# Patient Record
Sex: FEMALE | Race: WHITE | Employment: FULL TIME | URBAN - METROPOLITAN AREA
[De-identification: names, ages, dates, MRNs, and addresses within clinical notes are randomized per-mention and may not be internally consistent; named-entity substitution may affect disease eponyms.]

---

## 2017-07-19 ENCOUNTER — OFFICE VISIT (OUTPATIENT)
Dept: FAMILY MEDICINE CLINIC | Age: 52
End: 2017-07-19

## 2017-07-19 VITALS
HEART RATE: 74 BPM | WEIGHT: 147 LBS | OXYGEN SATURATION: 99 % | BODY MASS INDEX: 23.07 KG/M2 | SYSTOLIC BLOOD PRESSURE: 128 MMHG | TEMPERATURE: 98.2 F | DIASTOLIC BLOOD PRESSURE: 74 MMHG | HEIGHT: 67 IN | RESPIRATION RATE: 18 BRPM

## 2017-07-19 DIAGNOSIS — Z00.00 ROUTINE GENERAL MEDICAL EXAMINATION AT A HEALTH CARE FACILITY: Primary | ICD-10-CM

## 2017-07-19 DIAGNOSIS — N93.9 ABNORMAL UTERINE BLEEDING (AUB): ICD-10-CM

## 2017-07-19 DIAGNOSIS — D22.9 NUMEROUS MOLES: ICD-10-CM

## 2017-07-19 NOTE — PROGRESS NOTES
Chief Complaint   Patient presents with    Complete Physical     Had pap done at gyn this past Monday , [facility]. this Friday       Reviewed Record in preparation for visit and have obtained necessary documentation. Identified pt with two pt identifiers (Name @ )    Health Maintenance Due   Topic    PAP AKA CERVICAL CYTOLOGY     BREAST CANCER SCRN MAMMOGRAM          1. Have you been to the ER, urgent care clinic since your last visit? Hospitalized since your last visit? No    2. Have you seen or consulted any other health care providers outside of the [address] [place] [address] since your last visit? Include any pap smears or colon screening. Saw gyn this past Monday for pap.

## 2017-07-19 NOTE — PATIENT INSTRUCTIONS
Abnormal Uterine Bleeding: Care Instructions  Your Care Instructions  Abnormal uterine bleeding (AUB) is irregular bleeding from the uterus that is longer or heavier than usual or does not occur at your regular time. Sometimes it is caused by changes in hormone levels. It can also be caused by growths in the uterus, such as fibroids or polyps. Sometimes a cause cannot be found. You may have heavy bleeding when you are not expecting your period. Your doctor may suggest a pregnancy test, if you think you are pregnant. Follow-up care is a key part of your treatment and safety. Be sure to make and go to all appointments, and call your doctor if you are having problems. It's also a good idea to know your test results and keep a list of the medicines you take. How can you care for yourself at home? · Be safe with medicines. Take pain medicines exactly as directed. ¨ If the doctor gave you a prescription medicine for pain, take it as prescribed. ¨ If you are not taking a prescription pain medicine, ask your doctor if you can take an over-the-counter medicine. · You may be low in iron because of blood loss. Eat a balanced diet that is high in iron and vitamin C. Foods rich in iron include red meat, shellfish, eggs, beans, and leafy green vegetables. Talk to your doctor about whether you need to take iron pills or a multivitamin. When should you call for help? Call 911 anytime you think you may need emergency care. For example, call if:  · You passed out (lost consciousness). Call your doctor now or seek immediate medical care if:  · You have sudden, severe pain in your belly or pelvis. · You have severe vaginal bleeding. You are soaking through your usual pads or tampons every hour for 2 or more hours. · You feel dizzy or lightheaded, or you feel like you may faint. Watch closely for changes in your health, and be sure to contact your doctor if:  · You have new belly or pelvic pain.   · You have a fever.  · Your bleeding gets worse or lasts longer than 1 week. · You think you may be pregnant. Where can you learn more? Go to http://vasquez-william.info/. Enter B639 in the search box to learn more about \"Abnormal Uterine Bleeding: Care Instructions. \"  Current as of: October 13, 2016  Content Version: 11.3  © 8932-7208 [employer]. Care instructions adapted under license by [employer] (which disclaims liability or warranty for this information). If you have questions about a medical condition or this instruction, always ask your healthcare professional. [place] [address] any warranty or liability for your use of this information.

## 2017-07-19 NOTE — PROGRESS NOTES
HISTORY OF PRESENT ILLNESS  Mayelin Guardado is a 45 y.o. female. Blood pressure 128/74, pulse 74, temperature 98.2 °F (36.8 °C), temperature source Oral, resp. rate 18, height 5' 6.5\" (1.689 m), weight 147 lb (66.7 kg), last menstrual period 05/19/2017, SpO2 99 %. Body mass index is 23.37 kg/(m^2). Chief Complaint   Patient presents with    Complete Physical     Had pap done at gyn this past Monday , mammogram yogesh. this Friday        HPI  Mayelin Guardado 45 y.o. female  presents to the office today for complete physical.    Abnormal Uterine Bleeding: Pt states that she went to Dr. Francia Marcelino (Gynecology) on  07/17/17 for yearly checkup. Pt states she has abnormal uterine bleeding, which is aggravated during intercourse. Pt states that Dr. Elizabeth Perez wanted me to do a trans vaginal US due to abnormal uterine bleeding. Pt states that she had a mesh inserted 5 to 6 years ago. Pt states that Dr. Elizabeth Perez suggested a hysterectomy if problems worsen. I have ordered a Pelvic(Non-Obstetric) , and trans vaginal US for further evaluation. Anxiety/Depression: Depression screening completed and pt stated little interest in doings things several days, feeling down several days, sleep issues nearly every day, feeling tired several days, poor appetite/overeating not at all, feeling bad about oneself not at all, issues with concentration not at all, family members have noticed psychoretardation several days, and denies SI/HI. PHQ-9 score today 7. Pt states problems haven't made life too difficult. Pt states that she takes supplements for depression. I have advised pt that nothing would be needed due to low score. Health Maintenance: Pt is due for mammogram on 07/21/17. Current Outpatient Prescriptions   Medication Sig Dispense Refill    Mv,Ca,Min-FA-Herbal No.157 400 mcg tab Take  by mouth.  Cholecalciferol, Vitamin D3, (VITAMIN D3) 1,000 unit cap Take 5,000 Units by mouth.       zinc 15 mg tab Take  by mouth.      MULTIVITS,CA,MINERALS/IRON/FA (ONE-A-DAY WOMENS FORMULA PO) Take  by mouth daily.  GLUC MILLER/CHONDRO MILLER A/VIT C/MN (GLUCOSAMINE 1500 COMPLEX PO) Take  by mouth.  fish oil-dha-epa (FISH OIL) 1,200-144-216 mg Cap Take  by mouth.  flaxseed oil-vitamin e 1,000-1 mg-unit Cap Take  by mouth. No Known Allergies  Past Medical History:   Diagnosis Date    Depression      Past Surgical History:   Procedure Laterality Date    BREAST SURGERY PROCEDURE UNLISTED  1992    lumpectomy L breast     BREAST SURGERY PROCEDURE UNLISTED  2005    titanium inserted under left breasr    HX COLONOSCOPY  6/3/13    HX GYN  2010    Herpes simplex    HX ORTHOPAEDIC      sprained right ankle 2007    HX OTHER SURGICAL      Ganglin cyst removed from (R) wrist Apr 2010    HX UROLOGICAL      cystoscopy     Family History   Problem Relation Age of Onset   [facility] COPD Father    [facility] Breast Problems Mother    [facility] Pacemaker Mother      defribaliter    Cataract Mother     Hypertension Mother     High Cholesterol Mother     Herpes Mother      Social History   Substance Use Topics    Smoking status: Never Smoker    Smokeless tobacco: Never Used    Alcohol use No        Review of Systems   Constitutional: Negative for chills, diaphoresis, fever, malaise/fatigue and weight loss. HENT: Negative for congestion, ear discharge, ear pain, hearing loss, nosebleeds, sore throat and tinnitus. Eyes: Negative for blurred vision, double vision, photophobia, pain, discharge and redness. Respiratory: Negative for cough, hemoptysis, sputum production, shortness of breath, wheezing and stridor. Cardiovascular: Negative for chest pain, palpitations, orthopnea, claudication, leg swelling and PND. Gastrointestinal: Negative for abdominal pain, blood in stool, constipation, diarrhea, heartburn, melena, nausea and vomiting. Genitourinary: Negative for dysuria, flank pain, frequency, hematuria and urgency.         +Uterine Bleeding Musculoskeletal: Negative for back pain, falls, joint pain, myalgias and neck pain. Skin: Negative for itching and rash. Neurological: Negative for dizziness, tingling, tremors, sensory change, speech change, focal weakness, seizures, loss of consciousness, weakness and headaches. Endo/Heme/Allergies: Negative for environmental allergies and polydipsia. Does not bruise/bleed easily. Psychiatric/Behavioral: Positive for depression. Negative for hallucinations, memory loss, substance abuse and suicidal ideas. The patient is not nervous/anxious and does not have insomnia. All other systems reviewed and are negative. Physical Exam   Constitutional: She is oriented to person, place, and time. She appears well-developed and well-nourished. No distress. HENT:   Head: Normocephalic and atraumatic. Right Ear: External ear normal.   Left Ear: External ear normal.   Nose: Nose normal.   Mouth/Throat: Oropharynx is clear and moist. No oropharyngeal exudate. Eyes: Conjunctivae and EOM are normal. Pupils are equal, round, and reactive to light. Right eye exhibits no discharge. Left eye exhibits no discharge. No scleral icterus. Neck: Normal range of motion. Neck supple. No JVD present. No tracheal deviation present. No thyromegaly present. Cardiovascular: Normal rate, regular rhythm, normal heart sounds and intact distal pulses. Exam reveals no gallop and no friction rub. No murmur heard. Pulmonary/Chest: Effort normal and breath sounds normal. No stridor. No respiratory distress. She has no wheezes. She has no rales. She exhibits no tenderness. Abdominal: Soft. Bowel sounds are normal. She exhibits no distension and no mass. There is no tenderness. There is no rebound and no guarding. Musculoskeletal: Normal range of motion. She exhibits no edema or tenderness. Lymphadenopathy:     She has no cervical adenopathy. Neurological: She is alert and oriented to person, place, and time.  She has normal reflexes. No cranial nerve deficit. She exhibits normal muscle tone. Coordination normal.   Skin: Skin is warm and dry. No rash noted. She is not diaphoretic. No erythema. No pallor. Psychiatric: She has a normal mood and affect. Her behavior is normal. Judgment and thought content normal.   Nursing note and vitals reviewed. ASSESSMENT and [address] was seen today for complete physical.    Diagnoses and all orders for this visit:    Routine general medical examination at a health care facility  -     AMB POC EKG ROUTINE W/ 12 LEADS, INTER & REP  -     METABOLIC PANEL, COMPREHENSIVE  -     CBC WITH AUTOMATED DIFF  -     LIPID PANEL  -     TSH 3RD GENERATION  -     T4, FREE  -     VITAMIN D, 25 HYDROXY  -     URINALYSIS W/MICROSCOPIC    Abnormal uterine bleeding (AUB)  -     US TRANSVAGINAL; Future  -     US PELV NON OBS; Future  - Pt has abnormal uterine bleeding. I have ordered a Pelvic (Non-Obstetric) and Transvaginal US for further evaluation. Numerous moles  -     REFERRAL TO DERMATOLOGY José Luis Teran NP)  - Pt has numerous moles on skin. Will refer to José Luis Teran for further evaluation. Follow-up Disposition:  Return in about 1 year (around 7/19/2018) for physical.   Medication risks/benefits/costs/interactions/alternatives discussed with patient. Advised patient to call back or return to office if symptoms worsen/change/persist.  If patient cannot reach us or should anything more severe/urgent arise he/she should proceed directly to the nearest emergency department. Discussed expected course/resolution/complications of diagnosis in detail with patient. Patient given a written after visit summary which includes her diagnoses, current medications and vitals. Patient expressed understanding with the diagnosis and plan. Written by silverio Underwood, as dictated by Aquilino Jacobo M.D.   I have reviewed and agree with the above note and have made corrections where appropriate, Dr. Jewel Luke MD

## 2017-07-19 NOTE — LETTER
7/31/2017 10:09 AM 
 
Ms. Neris Carrion [address] [place] [phone] Dear Neris Carrion: 
 
Please find your most recent results below. Resulted Orders METABOLIC PANEL, COMPREHENSIVE Result Value Ref Range Glucose 81 65 - 99 mg/dL BUN 12 6 - 24 mg/dL Creatinine 0.71 0.57 - 1.00 mg/dL GFR est non-AA 97 >59 mL/min/1.73 GFR est  >59 mL/min/1.73  
 BUN/Creatinine ratio 17 9 - 23 Sodium 142 134 - 144 mmol/L Potassium 4.0 3.5 - 5.2 mmol/L Chloride 99 96 - 106 mmol/L  
 CO2 24 18 - 29 mmol/L Calcium 9.3 8.7 - 10.2 mg/dL Protein, total 7.1 6.0 - 8.5 g/dL Albumin 4.5 3.5 - 5.5 g/dL GLOBULIN, TOTAL 2.6 1.5 - 4.5 g/dL A-G Ratio 1.7 1.2 - 2.2 Bilirubin, total 0.3 0.0 - 1.2 mg/dL Alk. phosphatase 77 39 - 117 IU/L  
 AST (SGOT) 18 0 - 40 IU/L  
 ALT (SGPT) 16 0 - 32 IU/L Narrative Performed at:  [address]  [id number] : Heidi Jorgensen MD, Phone:  [phone] CBC WITH AUTOMATED DIFF Result Value Ref Range WBC 6.1 3.4 - 10.8 x10E3/uL  
 RBC 4.63 3.77 - 5.28 x10E6/uL HGB 13.5 11.1 - 15.9 g/dL HCT 40.8 34.0 - 46.6 % MCV 88 79 - 97 fL  
 MCH 29.2 26.6 - 33.0 pg  
 MCHC 33.1 31.5 - 35.7 g/dL  
 RDW 13.2 12.3 - 15.4 % PLATELET 186 278 - 989 x10E3/uL NEUTROPHILS 59 % Lymphocytes 31 % MONOCYTES 7 % EOSINOPHILS 2 % BASOPHILS 1 %  
 ABS. NEUTROPHILS 3.6 1.4 - 7.0 x10E3/uL Abs Lymphocytes 1.9 0.7 - 3.1 x10E3/uL  
 ABS. MONOCYTES 0.4 0.1 - 0.9 x10E3/uL  
 ABS. EOSINOPHILS 0.1 0.0 - 0.4 x10E3/uL  
 ABS. BASOPHILS 0.0 0.0 - 0.2 x10E3/uL IMMATURE GRANULOCYTES 0 %  
 ABS. IMM. GRANS. 0.0 0.0 - 0.1 x10E3/uL Narrative Performed at:  [address]  [id number] : Heidi Jorgensen MD, Phone:  [phone] LIPID PANEL Result Value Ref Range Cholesterol, total 225 (H) 100 - 199 mg/dL Triglyceride 150 (H) 0 - 149 mg/dL HDL Cholesterol 59 >39 mg/dL VLDL, calculated 30 5 - 40 mg/dL LDL, calculated 136 (H) 0 - 99 mg/dL Narrative Performed at:  [address]  [id number] : Marilin Villareal MD, Phone:  [phone] TSH 3RD GENERATION Result Value Ref Range TSH 1.080 0.450 - 4.500 uIU/mL Narrative Performed at:  [address]  [id number] : Marilin Villareal MD, Phone:  [phone] T4, FREE Result Value Ref Range T4, Free 1.18 0.82 - 1.77 ng/dL Narrative Performed at:  [address]  [id number] : Marilin Villareal MD, Phone:  [phone] VITAMIN D, 25 HYDROXY Result Value Ref Range VITAMIN D, 25-HYDROXY 65.0 30.0 - 100.0 ng/mL Comment:  
   Vitamin D deficiency has been defined by the [address] [place] [address] practice guideline as a 
level of serum 25-OH vitamin D less than 20 ng/mL (1,2). The Endocrine Society went on to further define vitamin D 
insufficiency as a level between 21 and 29 ng/mL (2). 1. IOM ([place] of Medicine). 2010. Dietary reference 
   intakes for calcium and D. [facility]: The 
   [employer]. 2. Rodger MF, Erin NC, Eduin GARCIA, et al. 
   Evaluation, treatment, and prevention of vitamin D 
   deficiency: an Endocrine Society clinical practice 
   guideline. JCEM. 2011 Jul; 96(7):1911-30. Narrative Performed at:  [address]  [id number] : Marilin Villareal MD, Phone:  [phone] URINALYSIS W/MICROSCOPIC Result Value Ref Range Specific Gravity 1.018 1.005 - 1.030  
 pH (UA) 7.5 5.0 - 7.5 Color Yellow Yellow Appearance Cloudy (A) Clear Leukocyte Esterase Negative Negative Protein Negative Negative/Trace Glucose Negative Negative Ketone Negative Negative Blood Negative Negative Bilirubin Negative Negative Urobilinogen 0.2 0.2 - 1.0 mg/dL Nitrites Negative Negative Microscopic Examination Comment Comment:  
   Microscopic follows if indicated. Microscopic exam See additional order Comment:  
   Microscopic was indicated and was performed. Narrative Performed at:  [address]  [id number] : Carine Gold MD, Phone:  [phone] CVD REPORT Result Value Ref Range INTERPRETATION Note Comment:  
   Supplement report is available. Narrative Performed at:  [address] [place] [address] [address] [place] [address]  [id number] : Asya Russo PhD, Phone:  [phone] MICROSCOPIC EXAMINATION Result Value Ref Range WBC 0-5 0 - 5 /hpf  
 RBC 0-2 0 - 2 /hpf Epithelial cells >10 (A) 0 - 10 /hpf Casts None seen None seen /lpf Crystals Present (A) N/A Crystal type Amorphous Sediment N/A Mucus Present Not Estab. Bacteria Few None seen/Few Narrative Performed at:  [address]  [id number] : Carine Gold MD, Phone:  [phone] Please call me if you have any questions: [phone] Sincerely, Aquilino Jacobo MD

## 2017-07-19 NOTE — MR AVS SNAPSHOT
Visit Information Date & Time Provider Department Dept. Phone Encounter #  
 7/19/2017 11:00 AM yRan Painter MD [address] [place] [address] [phone] [id number] Follow-up Instructions Return in about 1 year (around 7/19/2018) for physical.  
  
Upcoming Health Maintenance Date Due  
 PAP AKA CERVICAL CYTOLOGY 5/1/2015 BREAST CANCER SCRN MAMMOGRAM 7/15/2016 INFLUENZA AGE 9 TO ADULT 8/1/2017 DTaP/Tdap/Td series (2 - Td) 2/23/2022 COLONOSCOPY 6/3/2023 Allergies as of 7/19/2017  Review Complete On: 7/19/2017 By: Ryan Painter MD  
 No Known Allergies Current Immunizations  Reviewed on 4/12/2016 Name Date TDAP Vaccine 2/23/2012 Zoster Vaccine, Live 6/15/2014 Not reviewed this visit You Were Diagnosed With   
  
 Codes Comments Routine general medical examination at a health care facility    -  Primary ICD-10-CM: Z00.00 ICD-9-CM: V70.0 Abnormal uterine bleeding (AUB)     ICD-10-CM: N93.9 ICD-9-CM: 626.9 Numerous moles     ICD-10-CM: D22.9 ICD-9-CM: 216.9 Vitals BP Pulse Temp Resp Height(growth percentile) Weight(growth percentile) 128/74 (BP 1 Location: Right arm, BP Patient Position: Sitting) 74 98.2 °F (36.8 °C) (Oral) 18 5' 6.5\" (1.689 m) 147 lb (66.7 kg) LMP SpO2 BMI OB Status Smoking Status 05/19/2017 99% 23.37 kg/m2 Having regular periods Never Smoker Vitals History BMI and BSA Data Body Mass Index Body Surface Area  
 23.37 kg/m 2 1.77 m 2 Preferred Pharmacy Pharmacy Name Phone [address] [place] [address] [phone] Your Updated Medication List  
  
   
This list is accurate as of: 7/19/17 12:42 PM.  Always use your most recent med list.  
  
  
  
  
 FISH OIL 1,200-144-216 mg Cap Generic drug:  fish oil-dha-epa Take  by mouth. flaxseed oil-vitamin e 1,000-1 mg-unit Cap Take  by mouth. GLUCOSAMINE 1500 COMPLEX PO Take  by mouth. Mv,Ca,Min-FA-Herbal No.157 400 mcg Tab Take  by mouth. ONE-A-DAY WOMENS FORMULA PO Take  by mouth daily. VITAMIN D3 1,000 unit Cap Generic drug:  cholecalciferol Take 5,000 Units by mouth. zinc 15 mg Tab Take  by mouth. We Performed the Following AMB POC EKG ROUTINE W/ 12 LEADS, INTER & REP [42919 CPT(R)] CBC WITH AUTOMATED DIFF [68998 CPT(R)] LIPID PANEL [03991 CPT(R)] METABOLIC PANEL, COMPREHENSIVE [23157 CPT(R)] REFERRAL TO DERMATOLOGY [REF19 Custom] Comments:  
 Please evaluate patient for skin check. T4, FREE [id number] CPT(R)] TSH 3RD GENERATION [61384 CPT(R)] URINALYSIS W/MICROSCOPIC [51029 CPT(R)] VITAMIN D, 25 HYDROXY [id number] CPT(R)] Follow-up Instructions Return in about 1 year (around 7/19/2018) for physical.  
  
To-Do List   
 07/19/2017 Imaging:  US PELV NON OBS   
  
 07/19/2017 Imaging:  US TRANSVAGINAL Referral Information Referral ID Referred By Referred To  
  
 [id number] Roderick Wolfe NP   
   [address] Phone: [phone] Fax: [phone] Visits Status Start Date End Date 1 New Request 7/19/17 7/19/18 If your referral has a status of pending review or denied, additional information will be sent to support the outcome of this decision. Patient Instructions Abnormal Uterine Bleeding: Care Instructions Your Care Instructions Abnormal uterine bleeding (AUB) is irregular bleeding from the uterus that is longer or heavier than usual or does not occur at your regular time. Sometimes it is caused by changes in hormone levels. It can also be caused by growths in the uterus, such as fibroids or polyps. Sometimes a cause cannot be found. You may have heavy bleeding when you are not expecting your period.  Your doctor may suggest a pregnancy test, if you think you are pregnant. Follow-up care is a key part of your treatment and safety. Be sure to make and go to all appointments, and call your doctor if you are having problems. It's also a good idea to know your test results and keep a list of the medicines you take. How can you care for yourself at home? · Be safe with medicines. Take pain medicines exactly as directed. ¨ If the doctor gave you a prescription medicine for pain, take it as prescribed. ¨ If you are not taking a prescription pain medicine, ask your doctor if you can take an over-the-counter medicine. · You may be low in iron because of blood loss. Eat a balanced diet that is high in iron and vitamin C. Foods rich in iron include red meat, shellfish, eggs, beans, and leafy green vegetables. Talk to your doctor about whether you need to take iron pills or a multivitamin. When should you call for help? Call 911 anytime you think you may need emergency care. For example, call if: 
· You passed out (lost consciousness). Call your doctor now or seek immediate medical care if: 
· You have sudden, severe pain in your belly or pelvis. · You have severe vaginal bleeding. You are soaking through your usual pads or tampons every hour for 2 or more hours. · You feel dizzy or lightheaded, or you feel like you may faint. Watch closely for changes in your health, and be sure to contact your doctor if: 
· You have new belly or pelvic pain. · You have a fever. · Your bleeding gets worse or lasts longer than 1 week. · You think you may be pregnant. Where can you learn more? Go to http://vasquez-william.info/. Enter N924 in the search box to learn more about \"Abnormal Uterine Bleeding: Care Instructions. \" Current as of: October 13, 2016 Content Version: 11.3 © 4878-2289 [employer].  Care instructions adapted under license by [address] (which disclaims liability or warranty for this information). If you have questions about a medical condition or this instruction, always ask your healthcare professional. [address] any warranty or liability for your use of this information. Introducing [facility]! Dear Hugo Saba: Thank you for requesting a Reclog account. Our records indicate that you already have an active Reclog account. You can access your account anytime at [email or web address] Did you know that you can access your hospital and ER discharge instructions at any time in Reclog? You can also review all of your test results from your hospital stay or ER visit. Additional Information If you have questions, please visit the Frequently Asked Questions section of the Reclog website at [email or web address] Remember, Reclog is NOT to be used for urgent needs. For medical emergencies, dial 911. Now available from your iPhone and Android! Please provide this summary of care documentation to your next provider. Your primary care clinician is listed as Nick Marrero. If you have any questions after today's visit, please call [phone].

## 2017-07-20 LAB
25(OH)D3+25(OH)D2 SERPL-MCNC: 65 NG/ML (ref 30–100)
ALBUMIN SERPL-MCNC: 4.5 G/DL (ref 3.5–5.5)
ALBUMIN/GLOB SERPL: 1.7 {RATIO} (ref 1.2–2.2)
ALP SERPL-CCNC: 77 IU/L (ref 39–117)
ALT SERPL-CCNC: 16 IU/L (ref 0–32)
APPEARANCE UR: ABNORMAL
AST SERPL-CCNC: 18 IU/L (ref 0–40)
BACTERIA #/AREA URNS HPF: ABNORMAL /[HPF]
BASOPHILS # BLD AUTO: 0 X10E3/UL (ref 0–0.2)
BASOPHILS NFR BLD AUTO: 1 %
BILIRUB SERPL-MCNC: 0.3 MG/DL (ref 0–1.2)
BILIRUB UR QL STRIP: NEGATIVE
BUN SERPL-MCNC: 12 MG/DL (ref 6–24)
BUN/CREAT SERPL: 17 (ref 9–23)
CALCIUM SERPL-MCNC: 9.3 MG/DL (ref 8.7–10.2)
CASTS URNS QL MICRO: ABNORMAL /LPF
CHLORIDE SERPL-SCNC: 99 MMOL/L (ref 96–106)
CHOLEST SERPL-MCNC: 225 MG/DL (ref 100–199)
CO2 SERPL-SCNC: 24 MMOL/L (ref 18–29)
COLOR UR: YELLOW
CREAT SERPL-MCNC: 0.71 MG/DL (ref 0.57–1)
CRYSTALS URNS MICRO: ABNORMAL
EOSINOPHIL # BLD AUTO: 0.1 X10E3/UL (ref 0–0.4)
EOSINOPHIL NFR BLD AUTO: 2 %
EPI CELLS #/AREA URNS HPF: >10 /HPF
ERYTHROCYTE [DISTWIDTH] IN BLOOD BY AUTOMATED COUNT: 13.2 % (ref 12.3–15.4)
GLOBULIN SER CALC-MCNC: 2.6 G/DL (ref 1.5–4.5)
GLUCOSE SERPL-MCNC: 81 MG/DL (ref 65–99)
GLUCOSE UR QL: NEGATIVE
HCT VFR BLD AUTO: 40.8 % (ref 34–46.6)
HDLC SERPL-MCNC: 59 MG/DL
HGB BLD-MCNC: 13.5 G/DL (ref 11.1–15.9)
HGB UR QL STRIP: NEGATIVE
IMM GRANULOCYTES # BLD: 0 X10E3/UL (ref 0–0.1)
IMM GRANULOCYTES NFR BLD: 0 %
INTERPRETATION, 910389: NORMAL
KETONES UR QL STRIP: NEGATIVE
LDLC SERPL CALC-MCNC: 136 MG/DL (ref 0–99)
LEUKOCYTE ESTERASE UR QL STRIP: NEGATIVE
LYMPHOCYTES # BLD AUTO: 1.9 X10E3/UL (ref 0.7–3.1)
LYMPHOCYTES NFR BLD AUTO: 31 %
MCH RBC QN AUTO: 29.2 PG (ref 26.6–33)
MCHC RBC AUTO-ENTMCNC: 33.1 G/DL (ref 31.5–35.7)
MCV RBC AUTO: 88 FL (ref 79–97)
MICRO URNS: ABNORMAL
MICRO URNS: ABNORMAL
MONOCYTES # BLD AUTO: 0.4 X10E3/UL (ref 0.1–0.9)
MONOCYTES NFR BLD AUTO: 7 %
MUCOUS THREADS URNS QL MICRO: PRESENT
NEUTROPHILS # BLD AUTO: 3.6 X10E3/UL (ref 1.4–7)
NEUTROPHILS NFR BLD AUTO: 59 %
NITRITE UR QL STRIP: NEGATIVE
PH UR STRIP: 7.5 [PH] (ref 5–7.5)
PLATELET # BLD AUTO: 286 X10E3/UL (ref 150–379)
POTASSIUM SERPL-SCNC: 4 MMOL/L (ref 3.5–5.2)
PROT SERPL-MCNC: 7.1 G/DL (ref 6–8.5)
PROT UR QL STRIP: NEGATIVE
RBC # BLD AUTO: 4.63 X10E6/UL (ref 3.77–5.28)
RBC #/AREA URNS HPF: ABNORMAL /HPF
SODIUM SERPL-SCNC: 142 MMOL/L (ref 134–144)
SP GR UR: 1.02 (ref 1–1.03)
T4 FREE SERPL-MCNC: 1.18 NG/DL (ref 0.82–1.77)
TRIGL SERPL-MCNC: 150 MG/DL (ref 0–149)
TSH SERPL DL<=0.005 MIU/L-ACNC: 1.08 UIU/ML (ref 0.45–4.5)
UNIDENT CRYS URNS QL MICRO: PRESENT
UROBILINOGEN UR STRIP-MCNC: 0.2 MG/DL (ref 0.2–1)
VLDLC SERPL CALC-MCNC: 30 MG/DL (ref 5–40)
WBC # BLD AUTO: 6.1 X10E3/UL (ref 3.4–10.8)
WBC #/AREA URNS HPF: ABNORMAL /HPF

## 2017-07-26 ENCOUNTER — HOSPITAL ENCOUNTER (OUTPATIENT)
Dept: ULTRASOUND IMAGING | Age: 52
Discharge: HOME | End: 2017-07-26
Attending: FAMILY MEDICINE
Payer: COMMERCIAL

## 2017-07-26 DIAGNOSIS — N93.9 ABNORMAL UTERINE BLEEDING (AUB): ICD-10-CM

## 2017-07-26 PROCEDURE — 76856 US EXAM PELVIC COMPLETE: CPT

## 2017-07-26 PROCEDURE — 76830 TRANSVAGINAL US NON-OB: CPT

## 2017-07-26 NOTE — LETTER
7/28/2017 12:37 PM 
 
Ms. Radha Gallego [address] [phone] Dear Radha Gallego: 
 
Please find your most recent results below. Resulted Orders [address] Narrative INDICATION: abnormal bleeding possible uterine prolapse Exam: Transabdominal and transvaginal ultrasound of the pelvis. Transvaginal 
ultrasound is performed to better evaluate the adnexa and endometrium. Transabdominal: The uterus measures 10.4 cm x 4.6 cm x 7 cm. Endometrial stripe 
measures 7 mm in thickness. Right ovary measures 2 cm x 1.5 cm x 2 cm. Left 
ovary measures 2.8 cm x 1.7 cm x 3 cm. Vascular flow is present within both 
ovaries. No adnexal mass is seen. There is no free fluid within the pelvis. Transvaginal: The uterus measures 8.2 cm x 4.4 cm x 7 cm. There is a 2.1 cm x 
1.5 cm x 1.6 cm intramural fibroid within the posterior body of uterus. Endometrial stripe measures 6 mm in thickness. Right ovary measures 2.6 cm x 1.1 
cm x 2.2 cm. Left ovary measures 2.8 cm x 1.8 cm x 2.9 cm. There is a 2.2 cm x 
1.8 cm x 1.8 cm left ovarian cyst. Vascular flow is present within both ovaries. No adnexal mass is seen. There is no free fluid within the pelvis. IMPRESSION: 6 mm endometrial stripe. Endometrial stripe is within normal limits 
for a postmenopausal female with no history of bleeding, but is thickened for a 
postmenopausal female with a history of uterine bleeding. Please call me if you have any questions: [phone] Sincerely,

## 2017-07-27 NOTE — PROGRESS NOTES
IMPRESSION: 6 mm endometrial stripe. Endometrial stripe is within normal limits  for a postmenopausal female with no history of bleeding, but is thickened for a  postmenopausal female with a history of uterine bleeding.         Pt needs to see her gynecologist.. Monique Lindsay please inquire who it is and fax results and have make a follow up appt.

## 2017-07-28 NOTE — PROGRESS NOTES
Read by Jessica Juan at 7/28/2017 11:05 AM  Patient aware via my chart   Patient requesting to fax labs to [facility] [phone] per My chart message   Notified via my chart US results faxed to [phone] Dr Joellyn Bloch

## 2017-07-31 NOTE — PROGRESS NOTES
Inform pt to go to my chart to see results and recommendations    RECOMMENDATIONS:  None. Keep up the good work!   Labs are stable    Any questions let me know

## 2017-07-31 NOTE — PROGRESS NOTES
Read by Jessica Juan at 7/31/2017  8:49 AM  Patient aware via my chart  Faxed labs to Dr Joellyn Bloch [phone]

## 2021-09-17 ENCOUNTER — TRANSCRIBE ORDER (OUTPATIENT)
Dept: REGISTRATION | Age: 56
End: 2021-09-17

## 2021-09-17 DIAGNOSIS — Z01.812 ENCOUNTER FOR PREOPERATIVE SCREENING LABORATORY TESTING FOR COVID-19 VIRUS: Primary | ICD-10-CM

## 2021-09-17 DIAGNOSIS — Z20.822 ENCOUNTER FOR PREOPERATIVE SCREENING LABORATORY TESTING FOR COVID-19 VIRUS: Primary | ICD-10-CM

## 2021-09-21 ENCOUNTER — HOSPITAL ENCOUNTER (OUTPATIENT)
Dept: PREADMISSION TESTING | Age: 56
Discharge: HOME | End: 2021-09-21
Payer: COMMERCIAL

## 2021-09-21 DIAGNOSIS — Z20.822 ENCOUNTER FOR PREOPERATIVE SCREENING LABORATORY TESTING FOR COVID-19 VIRUS: ICD-10-CM

## 2021-09-21 DIAGNOSIS — Z01.812 ENCOUNTER FOR PREOPERATIVE SCREENING LABORATORY TESTING FOR COVID-19 VIRUS: ICD-10-CM

## 2021-09-21 PROCEDURE — U0003 INFECTIOUS AGENT DETECTION BY NUCLEIC ACID (DNA OR RNA); SEVERE ACUTE RESPIRATORY SYNDROME CORONAVIRUS 2 (SARS-COV-2) (CORONAVIRUS DISEASE [COVID-19]), AMPLIFIED PROBE TECHNIQUE, MAKING USE OF HIGH THROUGHPUT TECHNOLOGIES AS DESCRIBED BY CMS-2020-01-R: HCPCS

## 2021-09-22 LAB
SARS-COV-2, XPLCVT: NOT DETECTED
SOURCE, COVRS: NORMAL

## 2021-09-24 ENCOUNTER — ANESTHESIA EVENT (OUTPATIENT)
Dept: MEDSURG UNIT | Age: 56
End: 2021-09-24
Payer: COMMERCIAL

## 2021-09-24 ENCOUNTER — ANESTHESIA (OUTPATIENT)
Dept: MEDSURG UNIT | Age: 56
End: 2021-09-24
Payer: COMMERCIAL

## 2021-09-24 ENCOUNTER — HOSPITAL ENCOUNTER (OUTPATIENT)
Age: 56
Setting detail: OUTPATIENT SURGERY
Discharge: HOME | End: 2021-09-24
Attending: PODIATRIST | Admitting: PODIATRIST
Payer: COMMERCIAL

## 2021-09-24 VITALS
BODY MASS INDEX: 22.34 KG/M2 | HEIGHT: 66 IN | DIASTOLIC BLOOD PRESSURE: 72 MMHG | RESPIRATION RATE: 12 BRPM | OXYGEN SATURATION: 98 % | TEMPERATURE: 97.9 F | HEART RATE: 55 BPM | SYSTOLIC BLOOD PRESSURE: 126 MMHG | WEIGHT: 139 LBS

## 2021-09-24 DIAGNOSIS — M77.42 METATARSALGIA OF LEFT FOOT: Primary | ICD-10-CM

## 2021-09-24 PROCEDURE — 74011250636 HC RX REV CODE- 250/636: Performed by: NURSE ANESTHETIST, CERTIFIED REGISTERED

## 2021-09-24 PROCEDURE — 77030002916 HC SUT ETHLN J&J -A: Performed by: PODIATRIST

## 2021-09-24 PROCEDURE — 76060000063 HC AMB SURG ANES 1.5 TO 2 HR: Performed by: PODIATRIST

## 2021-09-24 PROCEDURE — 2709999900 HC NON-CHARGEABLE SUPPLY: Performed by: PODIATRIST

## 2021-09-24 PROCEDURE — 77030031139 HC SUT VCRL2 J&J -A: Performed by: PODIATRIST

## 2021-09-24 PROCEDURE — C1713 ANCHOR/SCREW BN/BN,TIS/BN: HCPCS | Performed by: PODIATRIST

## 2021-09-24 PROCEDURE — 77030006808 HC BLD SAW RECIP MCRA -A: Performed by: PODIATRIST

## 2021-09-24 PROCEDURE — 77030040922 HC BLNKT HYPOTHRM STRY -A

## 2021-09-24 PROCEDURE — 76030000003 HC AMB SURG OR TIME 1.5 TO 2: Performed by: PODIATRIST

## 2021-09-24 PROCEDURE — 74011000250 HC RX REV CODE- 250: Performed by: PODIATRIST

## 2021-09-24 PROCEDURE — 76210000036 HC AMBSU PH I REC 1.5 TO 2 HR: Performed by: PODIATRIST

## 2021-09-24 PROCEDURE — 77030039577 HC WRE K APTUS METS -B: Performed by: PODIATRIST

## 2021-09-24 PROCEDURE — 77030020269 HC MISC IMPL: Performed by: PODIATRIST

## 2021-09-24 RX ORDER — FENTANYL CITRATE 50 UG/ML
INJECTION, SOLUTION INTRAMUSCULAR; INTRAVENOUS AS NEEDED
Status: DISCONTINUED | OUTPATIENT
Start: 2021-09-24 | End: 2021-09-24 | Stop reason: HOSPADM

## 2021-09-24 RX ORDER — PROPOFOL 10 MG/ML
INJECTION, EMULSION INTRAVENOUS AS NEEDED
Status: DISCONTINUED | OUTPATIENT
Start: 2021-09-24 | End: 2021-09-24 | Stop reason: HOSPADM

## 2021-09-24 RX ORDER — HYDROCODONE BITARTRATE AND ACETAMINOPHEN 5; 325 MG/1; MG/1
1 TABLET ORAL
Qty: 30 TABLET | Refills: 0 | Status: SHIPPED | OUTPATIENT
Start: 2021-09-24 | End: 2021-09-29

## 2021-09-24 RX ORDER — CEFAZOLIN SODIUM 1 G/3ML
INJECTION, POWDER, FOR SOLUTION INTRAMUSCULAR; INTRAVENOUS AS NEEDED
Status: DISCONTINUED | OUTPATIENT
Start: 2021-09-24 | End: 2021-09-24 | Stop reason: HOSPADM

## 2021-09-24 RX ORDER — PROPOFOL 10 MG/ML
INJECTION, EMULSION INTRAVENOUS
Status: DISCONTINUED | OUTPATIENT
Start: 2021-09-24 | End: 2021-09-24 | Stop reason: HOSPADM

## 2021-09-24 RX ORDER — SODIUM CHLORIDE, SODIUM LACTATE, POTASSIUM CHLORIDE, CALCIUM CHLORIDE 600; 310; 30; 20 MG/100ML; MG/100ML; MG/100ML; MG/100ML
INJECTION, SOLUTION INTRAVENOUS
Status: DISCONTINUED | OUTPATIENT
Start: 2021-09-24 | End: 2021-09-24 | Stop reason: HOSPADM

## 2021-09-24 RX ORDER — MIDAZOLAM HYDROCHLORIDE 1 MG/ML
INJECTION, SOLUTION INTRAMUSCULAR; INTRAVENOUS AS NEEDED
Status: DISCONTINUED | OUTPATIENT
Start: 2021-09-24 | End: 2021-09-24 | Stop reason: HOSPADM

## 2021-09-24 RX ADMIN — PROPOFOL 50 MG: 10 INJECTION, EMULSION INTRAVENOUS at 09:39

## 2021-09-24 RX ADMIN — PROPOFOL 50 MCG/KG/MIN: 10 INJECTION, EMULSION INTRAVENOUS at 09:39

## 2021-09-24 RX ADMIN — FENTANYL CITRATE 25 MCG: 50 INJECTION, SOLUTION INTRAMUSCULAR; INTRAVENOUS at 10:28

## 2021-09-24 RX ADMIN — PROPOFOL 25 MG: 10 INJECTION, EMULSION INTRAVENOUS at 10:06

## 2021-09-24 RX ADMIN — MIDAZOLAM 2 MG: 1 INJECTION INTRAMUSCULAR; INTRAVENOUS at 09:31

## 2021-09-24 RX ADMIN — PROPOFOL 50 MG: 10 INJECTION, EMULSION INTRAVENOUS at 10:28

## 2021-09-24 RX ADMIN — SODIUM CHLORIDE, POTASSIUM CHLORIDE, SODIUM LACTATE AND CALCIUM CHLORIDE: 600; 310; 30; 20 INJECTION, SOLUTION INTRAVENOUS at 09:20

## 2021-09-24 RX ADMIN — CEFAZOLIN 2 G: 330 INJECTION, POWDER, FOR SOLUTION INTRAMUSCULAR; INTRAVENOUS at 09:46

## 2021-09-24 RX ADMIN — FENTANYL CITRATE 25 MCG: 50 INJECTION, SOLUTION INTRAMUSCULAR; INTRAVENOUS at 09:43

## 2021-09-24 RX ADMIN — MIDAZOLAM 1 MG: 1 INJECTION INTRAMUSCULAR; INTRAVENOUS at 09:40

## 2021-09-24 NOTE — ANESTHESIA PREPROCEDURE EVALUATION
Relevant Problems   No relevant active problems       Anesthetic History   No history of anesthetic complications            Review of Systems / Medical History  Patient summary reviewed, nursing notes reviewed and pertinent labs reviewed    Pulmonary  Within defined limits                 Neuro/Psych   Within defined limits           Cardiovascular  Within defined limits                Exercise tolerance: >4 METS     GI/Hepatic/Renal  Within defined limits              Endo/Other  Within defined limits           Other Findings              Physical Exam    Airway  Mallampati: II  TM Distance: > 6 cm  Neck ROM: normal range of motion   Mouth opening: Normal     Cardiovascular  Regular rate and rhythm,  S1 and S2 normal,  no murmur, click, rub, or gallop             Dental  No notable dental hx       Pulmonary  Breath sounds clear to auscultation               Abdominal  GI exam deferred       Other Findings            Anesthetic Plan    ASA: 1  Anesthesia type: MAC            Anesthetic plan and risks discussed with: Patient

## 2021-09-24 NOTE — DISCHARGE INSTRUCTIONS
Ml Valles DPM  [address], [address] [facility]  Phone [phone] - Fax [phone]    AFTER YOUR SURGERY    CONGRATULATIONS! You have gone through a difficult ordeal.  However, we hope this will mean a marked improvement in the function of your feet. Your help is now needed to speed the healing process by carefully following these instructions. 1. If you have a problem, call the office. We want you to be comfortable. 2. Return home immediately. Although foot is numb and no pain felt, any undue used of foot results in unnecessary bleeding, post operative pain and delayed healing. 3. Rest as much as possible for 2-3 days. You deserve it. Do not work, drive or operate machinery. 4. Elevate the foot. Do not let it dangle; that will cause it to swell. Decrease walking and standing as much as possible; they cause swelling also. 5. An ice bag may be used to help control any discomfort. Place ice pack over the ankle for 15 minute intervals. Do not allow the ice pack to leak water on the bandages. 6. Call the office day or night if:  1. Bleeding is active or persistent. If a little bleeding comes through the bandage, do not worry. 2. You should bump or injure your foot. 3. If medication does not relieve discomfort. 4. If your toes appear blue or feel cold. 7. Do not loosen or remove or alter surgical dressings under any circumstances without first consulting the doctor - do not bathe foot or allow bandage to become wet - this may result in infection or retard healing. 8. When you go to sleep, lossen the be sheets. You may want to lay a box on its side and place your foot inside of it. This way your sheet will not irritate the surgical site. 9. Numbness can last from 6-40 hours. 10. Eat a well balanced diet and drink fruit juices for the next three weeks. Avoid excessive salt and salty food; they cause swelling.   11. For any questions you may have, remember we are as near as the telephone. DO NOT HESITATE TO CALL IF YOUR FEEL SOMETHING IS WRONG. PLEASE. Call [phone]. After business hours please call my cell phone [phone].    ______________________________________________________________________    Anesthesia Discharge Instructions    After general anesthesia or intervenous sedation, for 24 hours or while taking prescription Narcotics:  · Limit your activities  · Do not drive or operate hazardous machinery  · If you have not urinated within 8 hours after discharge, please contact your surgeon on call. · Do not make important personal or business decisions  · Do not drink alcoholic beverages    Report the following to your surgeon:  · Excessive pain, swelling, redness or odor of or around the surgical area  · Temperature over 100.5 degrees  · Nausea and vomiting lasting longer than 4 hours or if unable to take medication  · Any signs of decreased circulation or nerve impairment to extremity:  Change in color, persistent numbness, tingling, coldness or increased pain.   · Any questions

## 2021-09-24 NOTE — ANESTHESIA POSTPROCEDURE EVALUATION
Procedure(s):  LEFT FOOT THIRD LEFT METATARSAL OSTEOTOMY . MAC    Anesthesia Post Evaluation        Patient participation: complete - patient participated  Level of consciousness: awake  Pain management: adequate  Airway patency: patent  Anesthetic complications: no  Cardiovascular status: hemodynamically stable  Respiratory status: acceptable  Hydration status: acceptable  Comments: The patient is ready for PACU discharge. Ling Aleman DO                   Post anesthesia nausea and vomiting:  controlled      INITIAL Post-op Vital signs:   Vitals Value Taken Time   /72 09/24/21 1200   Temp 36.6 °C (97.9 °F) 09/24/21 1116   Pulse 60 09/24/21 1211   Resp 10 09/24/21 1211   SpO2 98 % 09/24/21 1211   Vitals shown include unvalidated device data.

## 2021-09-24 NOTE — BRIEF OP NOTE
Brief Postoperative Note    Patient: Lux Castaneda  YOB: 1965  MRN: [id number]    Date of Procedure: 9/24/2021     Pre-Op Diagnosis: METATARSALGEA LEFT FOOT    Post-Op Diagnosis: Same as preoperative diagnosis.       Procedure(s):  LEFT FOOT THIRD LEFT METATARSAL OSTEOTOMY     Surgeon(s):  Delbert Varghese DPM    Surgical Assistant: None    Anesthesia: MAC     Estimated Blood Loss (mL): Minimal    Complications: None    Specimens: * No specimens in log *     Implants: * No implants in log *    Drains: * No LDAs found *    Findings: s/a    Electronically Signed by Mark Chen DPM on 9/24/2021 at 10:56 AM

## 2021-09-26 NOTE — OP NOTES
[address]  OPERATIVE REPORT    Name:  Herman Church  MR#:  [id number]  :  1965  ACCOUNT #:  [de-identified]  DATE OF SERVICE:  2021      SITE:  [facility]. PREOPERATIVE DIAGNOSIS:  Left foot metatarsalgia, third. POSTOPERATIVE DIAGNOSIS:  Left foot metatarsalgia, third. PROCEDURE PERFORMED:  Dorsiflexion metatarsal osteotomy, third metatarsal, left, with cortical screw fixation. SURGEON:  Charlette Rosas DPM    ASSISTANT:  No assistants. ANESTHESIA:  Local with IV sedation, local consisting of 0.25% Marcaine with 1% lidocaine with a total dilution of epinephrine of 1:400,000. COMPLICATIONS:  No complications. SPECIMENS REMOVED:  No specimens. IMPLANTS:  Two cortical screws. ESTIMATED BLOOD LOSS:  Minimal.    INDICATIONS:  The patient has presented with history of painful plantar third metatarsophalangeal joint, left foot, with weightbearing and ambulation since 2019. Conservative modalities of stable shoe gear and reduction of associated callus did not alleviate the patient's discomfort. Her pain has progressively gotten worse and definitive care has been requested by the patient. Surgery was discussed in detail including review of the preoperative x-ray and planned procedure. Risks, complications, and postoperative course were fully discussed with the patient. The patient is aware she may have a transfer of discomfort at the adjacent metatarsals postoperatively. All questions were answered to the patient's satisfaction. PROCEDURE:  The patient was brought to the operating room and placed in the supine position. After the patient was sedated, the left foot was locally anesthetized and prepped in the usual aseptic sterile manner. A 2.5-cm curvilinear incision was made overlying the third metatarsophalangeal joint. The incision was deepened via sharp dissection. All bleeders encountered were clamped and ligated as necessary.   All neurovascular structures were meticulously identified, preserved, and retracted. The subcutaneous tissues were further excised exposing the extensor tendons which were retracted medially. The capsular tissue overlying the third metatarsophalangeal joint was incised in a linear fashion. The capsular tissue was dissected from the metatarsal head and neck. With the use of a power sagittal saw, an incomplete osteotomy extending from distal dorsal to plantar proximal was made over the metaphyseal area of the distal metatarsal.  The plantar cortex was maintained intact. The metatarsal head was elevated and was no longer proud plantarly. The osteotomy was fixated with 2.0 cortical screws. The osteotomy was then tested and found to be stable without any movement of the metatarsal head. The wound was then flushed copiously with sterile saline. Capsular tissue was reapproximated with the use of 4-0 Vicryl suture. Subcutaneous tissues were reapproximated with the use of 4-0 Vicryl and skin was closed with the use of 4-0 nylon suture. The wound was then dressed with Betadine-impregnated Adaptic gauze and Chaz dressing. A surgical fracture shoe was dispensed with instructions to weight bear on heel. Both oral and written postop instructions given to the patient. Followup will be in office. A prescription for Norco 5 mg was e-mailed to the patient's pharmacy.         [id number]  D:  09/25/2021 17:36  T:  09/25/2021 23:18  JOB #:  [id number]

## (undated) DEVICE — DRSG GZ OIL EMUL CURAD 3X3 --

## (undated) DEVICE — NEEDLE HYPO 18GA L1.5IN PNK S STL HUB POLYPR SHLD REG BVL

## (undated) DEVICE — SPONGE GZ W4XL4IN COT 12 PLY TYP VII WVN C FLD DSGN

## (undated) DEVICE — SOLUTION IRRIG 1000ML 0.9% SOD CHL USP POUR PLAS BTL

## (undated) DEVICE — BNDG ELAS HK LOOP 3X5YD NS -- MATRIX

## (undated) DEVICE — TRAY PREP DRY W/ PREM GLV 2 APPL 6 SPNG 2 UNDPD 1 OVERWRAP

## (undated) DEVICE — SUTURE ETHLN SZ 4-0 L18IN NONABSORBABLE BLK L19MM PS-2 3/8 1667H

## (undated) DEVICE — SMALL RECIP. SAW BLADE, 8.1MM X 25.4MM X 0.5MM: Brand: MICROAIRE®

## (undated) DEVICE — NDL PRT INJ NSAF BLNT 18GX1.5 --

## (undated) DEVICE — SUTURE VCRL SZ 4-0 L27IN ABSRB UD L19MM PS-2 3/8 CIR PRIM J426H

## (undated) DEVICE — SYR 3ML LL TIP 1/10ML GRAD --

## (undated) DEVICE — EXTREMITY - SMH: Brand: MEDLINE INDUSTRIES, INC.

## (undated) DEVICE — STOCKINETTE: Brand: DEROYAL

## (undated) DEVICE — NEEDLE HYPO 27GA L1.25IN GRY POLYPR HUB S STL REG BVL STR

## (undated) DEVICE — SYRINGE,EAR/ULCER, 2 OZ, STERILE: Brand: MEDLINE

## (undated) DEVICE — BANDAGE,GAUZE,CONFORMING,3"X75",STRL,LF: Brand: MEDLINE

## (undated) DEVICE — HANDLE LT SNAP ON ULT DURABLE LENS FOR TRUMPF ALC DISPOSABLE